# Patient Record
Sex: MALE | Race: WHITE | NOT HISPANIC OR LATINO | Employment: UNEMPLOYED | URBAN - METROPOLITAN AREA
[De-identification: names, ages, dates, MRNs, and addresses within clinical notes are randomized per-mention and may not be internally consistent; named-entity substitution may affect disease eponyms.]

---
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
## 2024-01-07 ENCOUNTER — APPOINTMENT (EMERGENCY)
Dept: CT IMAGING | Facility: HOSPITAL | Age: 19
End: 2024-01-07
Payer: COMMERCIAL

## 2024-01-07 ENCOUNTER — HOSPITAL ENCOUNTER (EMERGENCY)
Facility: HOSPITAL | Age: 19
End: 2024-01-07
Attending: EMERGENCY MEDICINE | Admitting: EMERGENCY MEDICINE
Payer: COMMERCIAL

## 2024-01-07 ENCOUNTER — HOSPITAL ENCOUNTER (OUTPATIENT)
Facility: HOSPITAL | Age: 19
Setting detail: OBSERVATION
Discharge: HOME | End: 2024-01-08
Attending: SURGERY | Admitting: SURGERY
Payer: COMMERCIAL

## 2024-01-07 VITALS
TEMPERATURE: 97.7 F | HEART RATE: 69 BPM | OXYGEN SATURATION: 97 % | SYSTOLIC BLOOD PRESSURE: 122 MMHG | RESPIRATION RATE: 16 BRPM | DIASTOLIC BLOOD PRESSURE: 70 MMHG

## 2024-01-07 DIAGNOSIS — G44.309 POST-TRAUMATIC HEADACHE: ICD-10-CM

## 2024-01-07 DIAGNOSIS — R20.2 RIGHT HAND PARESTHESIA: ICD-10-CM
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
## 2024-01-07 DIAGNOSIS — R20.2 PARESTHESIAS: Primary | ICD-10-CM

## 2024-01-07 DIAGNOSIS — M54.2 NECK PAIN: ICD-10-CM
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
## 2024-01-07 DIAGNOSIS — S06.0XAA CONCUSSION: Primary | ICD-10-CM

## 2024-01-07 LAB
ALBUMIN SERPL BCP-MCNC: 5.1 G/DL (ref 3.5–5)
ALP SERPL-CCNC: 64 U/L (ref 34–104)
ALT SERPL W P-5'-P-CCNC: 9 U/L (ref 7–52)
ANION GAP SERPL CALCULATED.3IONS-SCNC: 8 MMOL/L
AST SERPL W P-5'-P-CCNC: 14 U/L (ref 13–39)
BASOPHILS # BLD AUTO: 0.05 THOUSANDS/ÂΜL (ref 0–0.1)
BASOPHILS NFR BLD AUTO: 0 % (ref 0–1)
BILIRUB SERPL-MCNC: 0.58 MG/DL (ref 0.2–1)
BUN SERPL-MCNC: 18 MG/DL (ref 5–25)
CALCIUM SERPL-MCNC: 9.7 MG/DL (ref 8.4–10.2)
CHLORIDE SERPL-SCNC: 104 MMOL/L (ref 96–108)
CO2 SERPL-SCNC: 27 MMOL/L (ref 21–32)
CREAT SERPL-MCNC: 1.01 MG/DL (ref 0.6–1.3)
EOSINOPHIL # BLD AUTO: 0.03 THOUSAND/ÂΜL (ref 0–0.61)
EOSINOPHIL NFR BLD AUTO: 0 % (ref 0–6)
ERYTHROCYTE [DISTWIDTH] IN BLOOD BY AUTOMATED COUNT: 13 % (ref 11.6–15.1)
GFR SERPL CREATININE-BSD FRML MDRD: 107 ML/MIN/1.73SQ M
GLUCOSE SERPL-MCNC: 97 MG/DL (ref 65–140)
HCT VFR BLD AUTO: 40.8 % (ref 36.5–49.3)
HGB BLD-MCNC: 13.6 G/DL (ref 12–17)
IMM GRANULOCYTES # BLD AUTO: 0.05 THOUSAND/UL (ref 0–0.2)
IMM GRANULOCYTES NFR BLD AUTO: 0 % (ref 0–2)
LYMPHOCYTES # BLD AUTO: 2.7 THOUSANDS/ÂΜL (ref 0.6–4.47)
LYMPHOCYTES NFR BLD AUTO: 22 % (ref 14–44)
MCH RBC QN AUTO: 28.9 PG (ref 26.8–34.3)
MCHC RBC AUTO-ENTMCNC: 33.3 G/DL (ref 31.4–37.4)
MCV RBC AUTO: 87 FL (ref 82–98)
MONOCYTES # BLD AUTO: 1.04 THOUSAND/ÂΜL (ref 0.17–1.22)
MONOCYTES NFR BLD AUTO: 9 % (ref 4–12)
NEUTROPHILS # BLD AUTO: 8.41 THOUSANDS/ÂΜL (ref 1.85–7.62)
NEUTS SEG NFR BLD AUTO: 69 % (ref 43–75)
NRBC BLD AUTO-RTO: 0 /100 WBCS
PLATELET # BLD AUTO: 181 THOUSANDS/UL (ref 149–390)
PMV BLD AUTO: 9.8 FL (ref 8.9–12.7)
POTASSIUM SERPL-SCNC: 3.4 MMOL/L (ref 3.5–5.3)
PROT SERPL-MCNC: 7.4 G/DL (ref 6.4–8.4)
RBC # BLD AUTO: 4.71 MILLION/UL (ref 3.88–5.62)
SODIUM SERPL-SCNC: 139 MMOL/L (ref 135–147)
WBC # BLD AUTO: 12.28 THOUSAND/UL (ref 4.31–10.16)

## 2024-01-07 PROCEDURE — 99285 EMERGENCY DEPT VISIT HI MDM: CPT | Performed by: EMERGENCY MEDICINE

## 2024-01-07 PROCEDURE — 99284 EMERGENCY DEPT VISIT MOD MDM: CPT

## 2024-01-07 PROCEDURE — 85025 COMPLETE CBC W/AUTO DIFF WBC: CPT | Performed by: EMERGENCY MEDICINE

## 2024-01-07 PROCEDURE — 70450 CT HEAD/BRAIN W/O DYE: CPT

## 2024-01-07 PROCEDURE — 96374 THER/PROPH/DIAG INJ IV PUSH: CPT

## 2024-01-07 PROCEDURE — 72125 CT NECK SPINE W/O DYE: CPT

## 2024-01-07 PROCEDURE — 96361 HYDRATE IV INFUSION ADD-ON: CPT

## 2024-01-07 PROCEDURE — 96375 TX/PRO/DX INJ NEW DRUG ADDON: CPT

## 2024-01-07 PROCEDURE — 36415 COLL VENOUS BLD VENIPUNCTURE: CPT | Performed by: EMERGENCY MEDICINE

## 2024-01-07 PROCEDURE — 80053 COMPREHEN METABOLIC PANEL: CPT | Performed by: EMERGENCY MEDICINE

## 2024-01-07 RX ORDER — DIPHENHYDRAMINE HYDROCHLORIDE 50 MG/ML
25 INJECTION INTRAMUSCULAR; INTRAVENOUS ONCE
Status: COMPLETED | OUTPATIENT
Start: 2024-01-07 | End: 2024-01-07

## 2024-01-07 RX ORDER — METOCLOPRAMIDE HYDROCHLORIDE 5 MG/ML
10 INJECTION INTRAMUSCULAR; INTRAVENOUS ONCE
Status: COMPLETED | OUTPATIENT
Start: 2024-01-07 | End: 2024-01-07

## 2024-01-07 RX ORDER — SODIUM CHLORIDE 9 MG/ML
100 INJECTION, SOLUTION INTRAVENOUS CONTINUOUS
Status: DISCONTINUED | OUTPATIENT
Start: 2024-01-07 | End: 2024-01-07 | Stop reason: HOSPADM

## 2024-01-07 RX ADMIN — SODIUM CHLORIDE 100 ML/HR: 0.9 INJECTION, SOLUTION INTRAVENOUS at 22:22

## 2024-01-07 RX ADMIN — METOCLOPRAMIDE 10 MG: 5 INJECTION, SOLUTION INTRAMUSCULAR; INTRAVENOUS at 21:47

## 2024-01-07 RX ADMIN — DIPHENHYDRAMINE HYDROCHLORIDE 25 MG: 50 INJECTION INTRAMUSCULAR; INTRAVENOUS at 21:47

## 2024-01-08 ENCOUNTER — APPOINTMENT (OUTPATIENT)
Dept: RADIOLOGY | Facility: HOSPITAL | Age: 19
End: 2024-01-08
Payer: COMMERCIAL

## 2024-01-08 VITALS
DIASTOLIC BLOOD PRESSURE: 61 MMHG | SYSTOLIC BLOOD PRESSURE: 117 MMHG | RESPIRATION RATE: 18 BRPM | WEIGHT: 160 LBS | HEART RATE: 66 BPM | TEMPERATURE: 98.2 F | OXYGEN SATURATION: 97 %

## 2024-01-08 PROBLEM — R20.2 PARESTHESIAS: Status: ACTIVE | Noted: 2024-01-08

## 2024-01-08 PROBLEM — M54.2 NECK PAIN: Status: ACTIVE | Noted: 2024-01-08
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
## 2024-01-08 PROCEDURE — 72141 MRI NECK SPINE W/O DYE: CPT

## 2024-01-08 RX ORDER — CITALOPRAM HYDROBROMIDE 10 MG/1
10 TABLET ORAL
Status: DISCONTINUED | OUTPATIENT
Start: 2024-01-08 | End: 2024-01-08 | Stop reason: HOSPADM

## 2024-01-08 RX ORDER — ENOXAPARIN SODIUM 100 MG/ML
30 INJECTION SUBCUTANEOUS EVERY 12 HOURS
Status: DISCONTINUED | OUTPATIENT
Start: 2024-01-08 | End: 2024-01-08 | Stop reason: HOSPADM

## 2024-01-08 RX ORDER — ACETAMINOPHEN 325 MG/1
650 TABLET ORAL EVERY 6 HOURS PRN
Status: DISCONTINUED | OUTPATIENT
Start: 2024-01-08 | End: 2024-01-08 | Stop reason: HOSPADM
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
## 2024-01-08 NOTE — ED PROVIDER NOTES
"Emergency Department Trauma Note  Phil Valentine 18 y.o. male MRN: [id number]  Unit/Bed#: ED TR13/TR13B Encounter: [id number]      Trauma Alert: Trauma Acuity: Trauma Evaluation  Model of Arrival:   via    Trauma Team: Current Providers  Attending Provider: Dana Howard MD  Registered Nurse: Amrita Israel RN  Consultants:     None      History of Present Illness     Chief Complaint:   Chief Complaint   Patient presents with    Fall     Pt reports hitting the back of his head on a metal beam while snowboarding today at 1930, now having visual disturbance in R eye with dizziness.  Denies n/v, asa/thinners.  Pt reports \"vision went dark for 5-10 seconds and I felt like I couldn't see\".  Pt was wearing helmet.     HPI:  Phil Valentine is a 18 y.o. male who presents with post traumatic headache.  Mechanism:Details of Incident: hit back of head on metal bar while snowboarding, vision went black for 5-10 seconds, R eye visual disturbance and dizziness Injury Date: 01/07/24 Injury Time: 1930      18 year old male presents for evaluation of headache, unsteadiness and visual changes which have been gradually worsening since striking his head during a snowboarding accident around 7:30 pm this evening.  Patient states he was attempting a trick jump on a metal rail when he fell back 2-3 ft, striking the back of his helmet on the metal rail.  Patient states his vision went completely black for several seconds and he felt confused initially.  He now has blurring of the superior visual field of his right eye, photophobia and headache which is occipital radiating to the right parietal region.  Patient additionally reports neck pain and states that in the time that he was waiting in the ED, his 1st-3rd digits of his right hand have become numb.  Patient has no chronic medical conditions.  He does not wear glasses or contact lenses.  Patient has not taken anything for his symptoms prior to " arrival.      Fall    Review of Systems    Historical Information     Immunizations:   Immunization History   Administered Date(s) Administered    DTaP 2004, 2004, 2004, 2005, 03/10/2008    Hep A, ped/adol, 2 dose 2011, 2011    Hep B / HiB 2004, 2004    Hep B, Adolescent or Pediatric 2004    Hepatitis A 2011, 2011    Hib (PRP-T) 2005    INFLUENZA 2004, 2004, 2005, 2008, 2009, 2010, 10/01/2014, 2016, 2018, 2019    IPV 2004, 2004, 2004, 03/10/2008    Influenza, seasonal, injectable 2006    Influenza, seasonal, injectable, preservative free 2011, 10/11/2012, 10/14/2013    MMR 2005, 2009    Meningococcal Conjugate (MCV4O) 2020    Meningococcal MCV4P 2015, 2020    Pneumococcal Conjugate PCV 7 2004, 2004, 2004, 2005    Tdap 2015    Varicella 2005, 2009       Past Medical History:   Diagnosis Date    GERD (gastroesophageal reflux disease)     Mesenteric adenitis     Noted on CT 2018, improved with conservative management       Family History   Problem Relation Age of Onset    Colon polyps Neg Hx     Colon cancer Neg Hx      History reviewed. No pertinent surgical history.  Social History     Tobacco Use    Smoking status: Never    Smokeless tobacco: Never   Vaping Use    Vaping status: Never Used   Substance Use Topics    Alcohol use: Not Currently    Drug use: Never     E-Cigarette/Vaping    E-Cigarette Use Never User      E-Cigarette/Vaping Substances    Nicotine No     Flavoring No        Family History: non-contributory    Meds/Allergies   Prior to Admission Medications   Prescriptions Last Dose Informant Patient Reported? Taking?   Albuterol Sulfate, sensor, 108 (90 Base) MCG/ACT AEPB   Yes No   Si puffs as needed   citalopram (CeleXA) 10 mg tablet   Yes No   Sig: Take 10 mg by  mouth daily in the early morning      Facility-Administered Medications: None       Allergies   Allergen Reactions    Amoxicillin Hives       PHYSICAL EXAM    PE limited by: none    Objective   Vitals:   First set: Temperature: 97.7 °F (36.5 °C) (01/07/24 2101)  Pulse: 67 (01/07/24 2101)  Respirations: 18 (01/07/24 2101)  Blood Pressure: 119/71 (01/07/24 2101)  SpO2: 97 % (01/07/24 2101)    Primary Survey:   (A) Airway: patent  (B) Breathing: bilateral breath sounds  (C) Circulation: Pulses:   normal  (D) Disabliity:  GCS Total:  15  (E) Expose:  Completed    Breath sounds equal. No crepitus or chest wall tenderness with compression over the chest. No pain or instability with compression over the pelvis. No bedside imaging required. Patient is stable to proceed to CT scan.    Secondary Survey: (Click on Physical Exam tab above)  Physical Exam  Vitals and nursing note reviewed.   HENT:      Head: Normocephalic and atraumatic.   Eyes:      Extraocular Movements: Extraocular movements intact.      Conjunctiva/sclera: Conjunctivae normal.      Pupils: Pupils are equal, round, and reactive to light.   Neck:      Comments: Tenderness C1-C3.  No step offs or deformities.  No T or L spine tenderness.  Cardiovascular:      Rate and Rhythm: Normal rate and regular rhythm.      Pulses: Normal pulses.           Radial pulses are 2+ on the right side and 2+ on the left side.        Dorsalis pedis pulses are 2+ on the right side and 2+ on the left side.        Posterior tibial pulses are 2+ on the right side and 2+ on the left side.      Heart sounds: Normal heart sounds.   Pulmonary:      Effort: Pulmonary effort is normal. No respiratory distress.      Breath sounds: Normal breath sounds.   Chest:      Chest wall: No tenderness or crepitus.   Abdominal:      General: There is no distension.      Palpations: Abdomen is soft.      Tenderness: There is no abdominal tenderness.   Musculoskeletal:         General: No deformity.       Right lower leg: No edema.      Left lower leg: No edema.   Skin:     General: Skin is warm and dry.   Neurological:      Mental Status: He is alert.      Motor: Motor function is intact.      Comments: Blurring of superior visual fields of right eye only (counts 1 finger instead of 2).  No facial asymmetry, dysarthria or aphasia.  Decreased sensation to right 1st-3rd digits. Normal sensation in all other distributions of the extremity.  Normal sensation in left upper extremity and bilateral lower extremities.  5/5 strength throughout.           Cervical spine cleared by clinical criteria? No (imaging required)      Invasive Devices       Peripheral Intravenous Line  Duration             Peripheral IV 01/07/24 Right Antecubital <1 day                    Lab Results:   Results Reviewed       Procedure Component Value Units Date/Time    Comprehensive metabolic panel [[id number]]  (Abnormal) Collected: 01/07/24 2128    Lab Status: Final result Specimen: Blood from Arm, Right Updated: 01/07/24 2151     Sodium 139 mmol/L      Potassium 3.4 mmol/L      Chloride 104 mmol/L      CO2 27 mmol/L      ANION GAP 8 mmol/L      BUN 18 mg/dL      Creatinine 1.01 mg/dL      Glucose 97 mg/dL      Calcium 9.7 mg/dL      AST 14 U/L      ALT 9 U/L      Alkaline Phosphatase 64 U/L      Total Protein 7.4 g/dL      Albumin 5.1 g/dL      Total Bilirubin 0.58 mg/dL      eGFR 107 ml/min/1.73sq m     Narrative:      National Kidney Disease Foundation guidelines for Chronic Kidney Disease (CKD):     Stage 1 with normal or high GFR (GFR > 90 mL/min/1.73 square meters)    Stage 2 Mild CKD (GFR = 60-89 mL/min/1.73 square meters)    Stage 3A Moderate CKD (GFR = 45-59 mL/min/1.73 square meters)    Stage 3B Moderate CKD (GFR = 30-44 mL/min/1.73 square meters)    Stage 4 Severe CKD (GFR = 15-29 mL/min/1.73 square meters)    Stage 5 End Stage CKD (GFR <15 mL/min/1.73 square meters)  Note: GFR calculation is accurate only with a steady state creatinine     CBC and differential [[id number]]  (Abnormal) Collected: 01/07/24 2128    Lab Status: Final result Specimen: Blood from Arm, Right Updated: 01/07/24 2133     WBC 12.28 Thousand/uL      RBC 4.71 Million/uL      Hemoglobin 13.6 g/dL      Hematocrit 40.8 %      MCV 87 fL      MCH 28.9 pg      MCHC 33.3 g/dL      RDW 13.0 %      MPV 9.8 fL      Platelets 181 Thousands/uL      nRBC 0 /100 WBCs      Neutrophils Relative 69 %      Immat GRANS % 0 %      Lymphocytes Relative 22 %      Monocytes Relative 9 %      Eosinophils Relative 0 %      Basophils Relative 0 %      Neutrophils Absolute 8.41 Thousands/µL      Immature Grans Absolute 0.05 Thousand/uL      Lymphocytes Absolute 2.70 Thousands/µL      Monocytes Absolute 1.04 Thousand/µL      Eosinophils Absolute 0.03 Thousand/µL      Basophils Absolute 0.05 Thousands/µL                    Imaging Studies:   Direct to CT: Yes  TRAUMA - CT head wo contrast   Final Result by Alvino Watson MD (01/07 2154)      No intracranial hemorrhage or calvarial fracture.                  Workstation performed: [id number]         TRAUMA - CT spine cervical wo contrast   Final Result by Alvino Watson MD (01/07 2203)      No cervical spine fracture or traumatic malalignment.                  Workstation performed: [id number]               Procedures  Procedures         ED Course  ED Course as of 01/07/24 2303   Sun Jan 07, 2024 2211 CT C-spine unremarkable; however, patient continues to have decreased sensation right 1st-3rd digits.  Normal motor exam.  Unable to clear C-spine.            Medical Decision Making  18 year old right-handed male presents for evaluation after snowboarding accident.  Post traumatic headache with blurring of right superior visual fields.  C-spine tenderness at the levels of C1-C3 with numbness of the 1st-3rd digits of the right hand (C6-C7 distribution); however, normal sensation over distributions at forearm and upper arm.  C-collar placed.  CTH and C-spine  negative; however, given persistent sensory deficit in the right hand, unable to clear C-spine.  Patient transferred to [facility] for further evaluation and management.    Amount and/or Complexity of Data Reviewed  Labs: ordered.  Radiology: ordered.    Risk  Prescription drug management.                Disposition  [facility] Transfer: No  Final diagnoses:   Concussion   Post-traumatic headache   Neck pain   Right hand paresthesia   Snowboard accident, initial encounter     Time reflects when diagnosis was documented in both MDM as applicable and the Disposition within this note       Time User Action Codes Description Comment    1/7/2024 10:03 PM Dana Howard Add [S06.0XAA] Concussion     1/7/2024 10:10 PM AnitaSonny reaganica J Add [G44.309] Post-traumatic headache     1/7/2024 10:10 PM AnitaSonny reaganica J Add [M54.2] Neck pain     1/7/2024 10:11 PM AnitaSonny reaganica J Add [R20.2] Right hand paresthesia     1/7/2024 10:11 PM Sonny Howardica J Add [V00.318A] Snowboard accident, initial encounter           ED Disposition       ED Disposition   Transfer to Another Facility-In Network    Condition   --    Date/Time   Sun Jan 7, 2024 10:18 PM    Comment   Phil Valentine should be transferred out to [facility].               MD Documentation      Flowsheet Row Most Recent Value   Patient Condition The patient has been stabilized such that within reasonable medical probability, no material deterioration of the patient condition or the condition of the unborn child(seb) is likely to result from the transfer   Benefits of Transfer Specialized equipment and/or services available at the receiving facility (Include comment)________________________  [neurosurgery, trauma surgery]   Risks of Transfer Potential for delay in receiving treatment, Increased discomfort during transfer, Possible worsening of condition or death during transfer, Potential deterioration of medical condition, Loss of IV   Accepting Physician Dr. Liz   Accepting  Facility Name, [facility], [place], [place]   Sending MD Dr. Howard   Provider Certification Risk of worsening condition, General risk, such as traffic hazards, adverse weather conditions, rough terrain or turbulence, possible failure of equipment (including vehicle or aircraft), or consequences of actions of persons outside the control of the transport personnel, Unanticipated needs of medical equipment and personnel during transport, The possibility of a transport vehicle being unavailable          RN Documentation      Flowsheet Row Most Recent Value   Accepting Facility Name, [facility], [place], [place]          Follow-up Information    None       Patient's Medications   Discharge Prescriptions    No medications on file     No discharge procedures on file.    PDMP Review       None            ED Provider  Electronically Signed by           Dana Howard MD  01/07/24 6445

## 2024-01-08 NOTE — ASSESSMENT & PLAN NOTE
-In right hand following snowboarding accident, because of this unable to clear cervical spine despite negative CT imaging  -Will obtain MRI  -Neurosurgery consulted, appreciate recommendations

## 2024-01-08 NOTE — DISCHARGE INSTR - AVS FIRST PAGE
Your evaluated today with neck pain and paresthesias.  You are not found to have any emergent pathology on your imaging and we feel safe discharging you home.  Please follow-up with your primary care physician within 1 week for reevaluation.    It is possible that you sustained a concussion.  I wrote a referral to the concussion clinic.  They will call you.  Please follow-up with them.  Please avoid any tinnitus in which she could reinjure your head.    Please return to the emergency department if you experience severe, significant headaches, recurrent vomiting, changes in mental status, weakness, changes in sensation, or other concerning symptoms.

## 2024-01-08 NOTE — QUICK NOTE
Cervical Collar Clearance:    The patient had a CT scan of the cervical spine demonstrating no acute injury. On exam, the patient had no midline point tenderness or paresthesias/numbness/weakness in the extremities. The patient had full range of motion (was then able to flex, extend, and rotate head laterally) without pain. There were no distracting injuries and the patient was not intoxicated.      The patient's cervical spine was cleared radiologically and clinically. Cervical collar removed at this time.     Jorge Luis Carlson DO  1/8/2024 3:52 AM

## 2024-01-08 NOTE — ASSESSMENT & PLAN NOTE
-CT imaging negative  -Will obtain MRI as above  -Maintain cervical collar, cervical spine precautions  -Multimodal pain control

## 2024-01-08 NOTE — EMTALA/ACUTE CARE TRANSFER
[facility] EMERGENCY DEPARTMENT  [address] [facility] [facility] [address]  Dept: [phone]      EMTALA TRANSFER CONSENT    NAME Phil Valentine                                         2005                              MRN [id number]    I have been informed of my rights regarding examination, treatment, and transfer   by Dr. Dana Howard*    Benefits: Specialized equipment and/or services available at the receiving facility (Include comment)________________________ (neurosurgery, trauma surgery)    Risks: Potential for delay in receiving treatment, Increased discomfort during transfer, Possible worsening of condition or death during transfer, Potential deterioration of medical condition, Loss of IV      Consent for Transfer:  I acknowledge that my medical condition has been evaluated and explained to me by the emergency department physician or other qualified medical person and/or my attending physician, who has recommended that I be transferred to the service of  Accepting Physician: Dr. Liz at Accepting Facility Name, City & State : [place], [place]. The above potential benefits of such transfer, the potential risks associated with such transfer, and the probable risks of not being transferred have been explained to me, and I fully understand them.  The doctor has explained that, in my case, the benefits of transfer outweigh the risks.  I agree to be transferred.    I authorize the performance of emergency medical procedures and treatments upon me in both transit and upon arrival at the receiving facility.  Additionally, I authorize the release of any and all medical records to the receiving facility and request they be transported with me, if possible.  I understand that the safest mode of transportation during a medical emergency is an ambulance and that the Hospital advocates the use of this mode of transport. Risks of traveling to  the receiving facility by car, including absence of medical control, life sustaining equipment, such as oxygen, and medical personnel has been explained to me and I fully understand them.    (BRITTNEY CORRECT BOX BELOW)  [  ]  I consent to the stated transfer and to be transported by ambulance/helicopter.  [  ]  I consent to the stated transfer, but refuse transportation by ambulance and accept full responsibility for my transportation by car.  I understand the risks of non-ambulance transfers and I exonerate the Hospital and its staff from any deterioration in my condition that results from this refusal.    X___________________________________________    DATE  24  TIME________  Signature of patient or legally responsible individual signing on patient behalf           RELATIONSHIP TO PATIENT_________________________          Provider Certification    NAME Phil Valentine                                         2005                              MRN [id number]    A medical screening exam was performed on the above named patient.  Based on the examination:    Condition Necessitating Transfer The primary encounter diagnosis was Concussion. Diagnoses of Post-traumatic headache, Neck pain, Right hand paresthesia, and Snowboard accident, initial encounter were also pertinent to this visit.    Patient Condition: The patient has been stabilized such that within reasonable medical probability, no material deterioration of the patient condition or the condition of the unborn child(seb) is likely to result from the transfer    Reason for Transfer:      Transfer Requirements: Facility [place], [place]   Space available and qualified personnel available for treatment as acknowledged by    [facility] to accept transfer and to provide appropriate medical treatment as acknowledged by       Dr. Liz  Appropriate medical records of the examination and treatment of the patient are provided at the time  of transfer   STAFF INITIAL WHEN COMPLETED _______  Transfer will be performed by qualified personnel from    and appropriate transfer equipment as required, including the use of necessary and appropriate life support measures.    Provider Certification: I have examined the patient and explained the following risks and benefits of being transferred/refusing transfer to the patient/family:  Risk of worsening condition, General risk, such as traffic hazards, adverse weather conditions, rough terrain or turbulence, possible failure of equipment (including vehicle or aircraft), or consequences of actions of persons outside the control of the transport personnel, Unanticipated needs of medical equipment and personnel during transport, The possibility of a transport vehicle being unavailable      Based on these reasonable risks and benefits to the patient and/or the unborn child(seb), and based upon the information available at the time of the patient’s examination, I certify that the medical benefits reasonably to be expected from the provision of appropriate medical treatments at another medical facility outweigh the increasing risks, if any, to the individual’s medical condition, and in the case of labor to the unborn child, from effecting the transfer.    X____________________________________________ DATE 01/07/24        TIME_______      ORIGINAL - SEND TO MEDICAL RECORDS   COPY - SEND WITH PATIENT DURING TRANSFER

## 2024-01-08 NOTE — H&P
"[facility]  H&P  Name: Phil Valentine 18 y.o. male I MRN: [id number]  Unit/Bed#: ED 19 I Date of Admission: 1/7/2024   Date of Service: 1/8/2024 I Hospital Day: 0      Assessment/Plan   Snowboarding accident, initial encounter  Assessment & Plan  -Multimodal pain control    Neck pain  Assessment & Plan  -CT imaging negative  -Will obtain MRI as above  -Maintain cervical collar, cervical spine precautions  -Multimodal pain control    Paresthesias  Assessment & Plan  -In right hand following snowboarding accident, because of this unable to clear cervical spine despite negative CT imaging  -Will obtain MRI  -Neurosurgery consulted, appreciate recommendations           Trauma Alert: Evaluation; trauma team notified at 12:15 via text   Model of Arrival: Transfer UB     Trauma Team: Attending To and Residents Jorge Luis  Consultants:     Neurosurgery: routine consult; Epic consult order placed;     History of Present Illness     Chief Complaint: neck pain  Mechanism:Fall     HPI:    Phil Valentine is a 18 y.o. male who presents as a trauma transfer following a snowboarding accident.  He reports that he was snowboarding prior to arrival and had a mechanical fall striking his head against the metal pole.  He had a brief 4 to 5 seconds where he \"blacked out\".  He was wearing a helmet.  He denies any AP/AC use.  He experienced neck pain following the accident and was evaluated at [facility] emergency department.  He had a CT head and CT cervical spine performed which were negative for acute abnormalities.  Patient C-spine was unable to be cleared secondary to paresthesias in his right hand and so he was sent to [facility] for further evaluation.    Review of Systems   Eyes:  Negative for visual disturbance.   Respiratory:  Negative for shortness of breath.    Cardiovascular:  Negative for chest pain.   Musculoskeletal:  Positive for neck pain.   Neurological:  Positive for " numbness. Negative for weakness.     12-point, complete review of systems was reviewed and negative except as stated above.     Historical Information     Past Medical History:   Diagnosis Date    GERD (gastroesophageal reflux disease)     Mesenteric adenitis     Noted on CT December 2018, improved with conservative management     History reviewed. No pertinent surgical history.     Social History     Tobacco Use    Smoking status: Never    Smokeless tobacco: Never   Vaping Use    Vaping status: Never Used   Substance Use Topics    Alcohol use: Not Currently    Drug use: Never     Immunization History   Administered Date(s) Administered    DTaP 2004, 2004, 2004, 06/06/2005, 03/10/2008    Hep A, ped/adol, 2 dose 01/19/2011, 07/20/2011    Hep B / HiB 2004, 2004    Hep B, Adolescent or Pediatric 2004    Hepatitis A 01/19/2011, 07/20/2011    Hib (PRP-T) 06/06/2005    INFLUENZA 2004, 2004, 11/16/2005, 11/20/2008, 12/30/2009, 11/18/2010, 10/01/2014, 09/21/2016, 09/04/2018, 09/09/2019    IPV 2004, 2004, 2004, 03/10/2008    Influenza, seasonal, injectable 11/17/2006    Influenza, seasonal, injectable, preservative free 11/26/2011, 10/11/2012, 10/14/2013    MMR 03/03/2005, 03/09/2009    Meningococcal Conjugate (MCV4O) 03/18/2020    Meningococcal MCV4P 07/06/2015, 03/18/2020    Pneumococcal Conjugate PCV 7 2004, 2004, 2004, 06/06/2005    Tdap 07/06/2015    Varicella 03/03/2005, 03/09/2009     Last Tetanus: 2015  Family History: Non-contributory     Meds/Allergies   all current active meds have been reviewed     Allergies   Allergen Reactions    Amoxicillin Hives       Objective   Initial Vitals:   Pulse: 72 (01/08/24 0000)  Respirations: 18 (01/08/24 0000)  Blood Pressure: 123/58 (01/08/24 0000)    Primary Survey:   Airway:        Status: patent;        Pre-hospital Interventions: none        Hospital Interventions: none  Breathing:         Pre-hospital Interventions: none       Effort: normal       Right breath sounds: normal       Left breath sounds: normal  Circulation:        Rhythm: regular       Rate: regular   Right Pulses Left Pulses    R radial: 2+    R pedal: 2+     L radial: 2+    L pedal: 2+       Disability:        GCS: Eye: 4; Verbal: 5 Motor: 6 Total: 15       Right Pupil: 3 mm;  round;  reactive         Left Pupil:  3 mm;  round;  reactive      R Motor Strength L Motor Strength    R : 5/5  R dorsiflex: 5/5  R plantarflex: 5/5 L : 5/5  L dorsiflex: 5/5  L plantarflex: 5/5          Exposure:       Completed: Yes      Secondary Survey:  Physical Exam  Constitutional:       Appearance: Normal appearance.   HENT:      Head: Normocephalic and atraumatic.      Right Ear: External ear normal.      Left Ear: External ear normal.      Nose: Nose normal.      Mouth/Throat:      Mouth: Mucous membranes are moist.   Eyes:      Extraocular Movements: Extraocular movements intact.      Pupils: Pupils are equal, round, and reactive to light.   Neck:      Comments: Cervical collar in place  Cardiovascular:      Rate and Rhythm: Normal rate and regular rhythm.      Pulses: Normal pulses.   Pulmonary:      Effort: Pulmonary effort is normal.      Breath sounds: Normal breath sounds.   Abdominal:      General: Abdomen is flat.      Palpations: Abdomen is soft.      Tenderness: There is no abdominal tenderness.   Musculoskeletal:      Cervical back: Tenderness (Upper cervical spine) present.      Right lower leg: No edema.      Left lower leg: No edema.   Skin:     General: Skin is warm and dry.   Neurological:      General: No focal deficit present.      Mental Status: He is alert.      Comments: Patient reports resolved paresthesias in hand         Invasive Devices       Peripheral Intravenous Line  Duration             Peripheral IV 01/07/24 Right Antecubital <1 day                  Lab Results: I have personally reviewed all pertinent  laboratory/test results from 01/08/24, including the preceding 24 hours.  Recent Labs     01/07/24  2128   WBC 12.28*   HGB 13.6   HCT 40.8      SODIUM 139   K 3.4*      CO2 27   BUN 18   CREATININE 1.01   GLUC 97   AST 14   ALT 9   ALB 5.1*   TBILI 0.58   ALKPHOS 64       Imaging Results: I have personally reviewed pertinent images saved in PACS. CT scan findings (and other pertinent positive findings on images) were discussed with radiology. My interpretation of the images/reports are as follows:  Chest Xray(s): N/A   FAST exam(s): N/A   CT Scan(s): negative for acute findings   Additional Xray(s): N/A     Other Studies: none    Code Status: Level 1 - Full Code  Advance Directive and Living Will:      Power of :    POLST:    I have spent 32 minutes with Patient and family today in which greater than 50% of this time was spent in counseling/coordination of care regarding Diagnostic results, Impressions, Documenting in the medical record, Reviewing / ordering tests, medicine, procedures  , and Obtaining or reviewing history  .

## 2024-01-17 ENCOUNTER — OFFICE VISIT (OUTPATIENT)
Dept: OBGYN CLINIC | Facility: CLINIC | Age: 19
End: 2024-01-17
Payer: COMMERCIAL

## 2024-01-17 ENCOUNTER — TELEPHONE (OUTPATIENT)
Age: 19
End: 2024-01-17

## 2024-01-17 VITALS
WEIGHT: 155 LBS | HEIGHT: 71 IN | BODY MASS INDEX: 21.7 KG/M2 | SYSTOLIC BLOOD PRESSURE: 112 MMHG | HEART RATE: 68 BPM | DIASTOLIC BLOOD PRESSURE: 80 MMHG

## 2024-01-17 DIAGNOSIS — H53.9 VISION CHANGES: ICD-10-CM

## 2024-01-17 DIAGNOSIS — S13.4XXA WHIPLASH INJURY TO NECK, INITIAL ENCOUNTER: ICD-10-CM
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
## 2024-01-17 DIAGNOSIS — S06.0X0A CONCUSSION WITHOUT LOSS OF CONSCIOUSNESS, INITIAL ENCOUNTER: Primary | ICD-10-CM

## 2024-01-17 PROCEDURE — 99204 OFFICE O/P NEW MOD 45 MIN: CPT | Performed by: FAMILY MEDICINE

## 2024-01-17 NOTE — TELEPHONE ENCOUNTER
Reviewed ov note and see referral for [facility] and for neurology-no specific name.  Please see msg from pt's mom, Olya and advise. Thank you.

## 2024-01-17 NOTE — TELEPHONE ENCOUNTER
Caller: Olya (mom)     Doctor: Lashae     Reason for call: patient received a referral  to go to PT but they where not sure if he was supposed to schedule with [facility] or [facility] and she also wanted to know if there was a certain neurologist     Call back#: [phone]

## 2024-01-17 NOTE — PROGRESS NOTES
1. Concussion without loss of consciousness, initial encounter   Physical Therapy    Ambulatory Referral to Ophthalmology      2. Snowboarding accident, initial encounter  Ambulatory Referral to Comprehensive Concussion Program      3. Whiplash injury to neck, initial encounter   Physical Therapy      4. Vision changes  Ambulatory Referral to Neurology    Ambulatory Referral to Ophthalmology        Orders Placed This Encounter   Procedures    Ambulatory Referral to Neurology    Ambulatory Referral to Ophthalmology     Physical Therapy        IMAGING STUDIES: (I personally reviewed images in PACS and report):      PAST REPORTS:  CT head 1/7/2024:  Report: No intracranial hemorrhage or calvarial fracture    CT cervical spine 1/7/2024:  Report: No cervical spine fracture or traumatic malalignment    MRI cervical spine 1/8/2024:  Report:Unremarkable unenhanced MRI of the cervical spine.  Specifically no evidence of spinal cord contusion/injury as clinically questioned.      ASSESSMENT/PLAN:  Complex Head injury with Mild Traumatic Brain Injury consistent with Concussion  Written letter provided for school and/or work accommodations due to functional deficit  Neck whiplash injury  DOI: 1/7/24 Snowboarding  FUI: 10 days  Occupation: Sales    Repeat X-ray next visit: None    Return in about 2 weeks (around 1/31/2024).    Patient instructions below verbally summarized in person during encounter:  Patient Instructions   Due to vision changes in right eye, I recommended urgent appointment with ophthalmology to protect patient's vision. Patient expressed understanding and agreed to plan.     I recommended against work  Begin physical therapy for neck  Take a Multivitamin daily if not already  Sleep hygiene- at least 8 hours of sleep  No excessive use of digital screens but may use phone and play video games with breaks to rest the eyes at least once per hour. Stop all digital screen use if symptoms begin to worsen  Do  not take NSAIDs (ibuprofen, motrin, aspirin, advil, aleve, naproxen) until 72 hours after injury event, but may take tylenol (acetaminophen) as needed for headaches    red flags symptoms occurring at any time even after 24 hours include: severe or worsening headaches, somnolence/sleepiness/lethargy, confusion, restlessness/unsteadiness, seizures, vision changes, vomiting, fever, stiff neck, loss of bowel or bladder control, and weakness or numbness of any part of body. If red flag symptoms occur then immediately go to ER. If patient suffers another blow to head or body during sports or non-sports play then there is a risk of severe and possibly permanent brain injury from second hit syndrome brain edema. During concussion recovery, patient is to not participate in pick-up games, sports, weight-training, exercise, or gym until cleared by physician. If any return of symptoms requiring more academic rest then sports play must also be suspended due to delayed healing of concussion.         __________________________________________________________________________    HISTORY OF PRESENT ILLNESS:    Patient ID:  Phil Valentine is a 18 y.o. male    School:    Related to:  Snowboarding    School Status: Not back to school    Injury Description:  Date / Time: 1/7/2024  :  Patient  Injury Description:   Fell back to 3 feet striking the back of his helmet onto metal rail while tending to perform snowboarding trick.  Patient seen emergency department where he complained of blurred vision photophobia and headache.  Associated symptoms do include neck pain with numbness into the right hand.    Laboratory investigation revealed slight hypokalemia 3.4 as well as slight elevation leukocytosis WBC 12.28.    He was placed in observation after injury event and discharged home.     Amnesia:   Retrograde:  no   Anterograde:  no   LOC: YES    Early Signs:  Blurred vision and Headache  Seizures:  No  CT Scan:  Yes   History of  Concussion:  Yes.   How many?  1 in 2022     Headache History:  no chronic headaches    Currently constant moderate worse with light and noise    Right eye depth perception and focusing is blurry.     Family History of Headache:  No  Developmental History:   no  History of Sleep Disorder:  No  Psychiatric History:   no  Do symptoms worsen with Physical Activity?  N/A  Do symptoms worsen with Cognitive Activity?  N/A  Unable to tolerate computer screens      Overall Rating:  What percent is this person back to normal?  Patient 60 %    Posterior neck pain improved mildly. No radiation of pain down the arms. Numbness right hand and arm now resolved.       Review of Systems   Constitutional:  Positive for fatigue. Negative for chills and fever.   HENT:  Negative for ear pain and hearing loss.    Eyes:  Positive for photophobia.   Gastrointestinal:  Positive for nausea. Negative for vomiting.   Musculoskeletal:  Positive for neck pain.   Neurological:  Positive for dizziness and headaches.   Psychiatric/Behavioral:  Positive for decreased concentration and sleep disturbance. Negative for behavioral problems, confusion and suicidal ideas. The patient is not nervous/anxious.          Following history reviewed and update:    Past Medical History:   Diagnosis Date    GERD (gastroesophageal reflux disease)     Mesenteric adenitis     Noted on CT December 2018, improved with conservative management     History reviewed. No pertinent surgical history.  Social History   Social History     Substance and Sexual Activity   Alcohol Use Not Currently     Social History     Substance and Sexual Activity   Drug Use Never     Social History     Tobacco Use   Smoking Status Never   Smokeless Tobacco Never     Family History   Problem Relation Age of Onset    Colon polyps Neg Hx     Colon cancer Neg Hx      Allergies   Allergen Reactions    Amoxicillin Hives          Physical Exam  /80 (BP Location: Left arm, Patient Position:  "Sitting, Cuff Size: Standard)   Pulse 68   Ht 5' 11\" (1.803 m)   Wt 70.3 kg (155 lb)   BMI 21.62 kg/m²     Constitutional:  see vital signs  Gen: well-developed, normocephalic/atraumatic, well-groomed  Eyes: No inflammation or discharge of conjunctiva or lids; sclera clear   Pharynx: no inflammation, lesion, or mass of lips  Neck: supple, no masses, non-distended  MSK: no inflammation, lesion, mass, or clubbing of nails and digits except for other than mentioned below  SKIN: no visible rashes or skin lesions  Pulmonary/Chest: Effort normal. No respiratory distress.   NEURO: cranial nerves grossly intact  PSYCH:  Alert and oriented to person, place, and time; recent and remote memory intact; mood normal, no depression, anxiety, or agitation, judgment and insight good and intact     Ortho Exam  Cervical  ROM: intact  Midline spinous process tenderness: +occiput   Muscular Tenderness: None  Sensation UE Bilateral:  C5: normal  C6: normal  C7: normal  C8: normal  T1: normal  Strength UE: 5/5 elbow, wrist, fingers bilateral       Williamson Sign: none  Raccoon Eyes: none  Ear Drainage: none  Nose Drainage: none  PERRL  EOMI:  Normal without pain  Smooth Pursuits: normal  Two finger Point Saccades (two finger points eye movement): abnormal horizontal nystagmus  One finger Focus with Head Rotation:  normal  Near-Point Convergence (<10cm): abnormal.    Unilateral Monocular Accomodation (<12cm): normal left; abnormal right  Finger to nose: Normal  No Dysdiadokinesia  Single Leg Stance Eyes Open: normal  Single Leg Stance Eyes Closed: abnormal  Tandem Gait Eyes Open: normal  Tandem Gait Eyes Closed: abnormal    __________________________________________________________________________  Procedures              "

## 2024-01-17 NOTE — LETTER
January 17, 2024     Patient: Phil Valentine  YOB: 2005  Date of Visit: 1/17/2024      To Whom it May Concern:    Phil Valentine is under my professional care. Phil was seen in my office on 1/17/2024. I recommend against work at this time. Patient to be reevaluated on or about 2/1/24.     If you have any questions or concerns, please don't hesitate to call.         Sincerely,          Walter Bhardwaj III, DO        CC: No Recipients

## 2024-01-17 NOTE — PATIENT INSTRUCTIONS
Due to vision changes in right eye, I recommended urgent appointment with ophthalmology to protect patient's vision. Patient expressed understanding and agreed to plan.     I recommended against work  Begin physical therapy for neck  Take a Multivitamin daily if not already  Sleep hygiene- at least 8 hours of sleep  No excessive use of digital screens but may use phone and play video games with breaks to rest the eyes at least once per hour. Stop all digital screen use if symptoms begin to worsen  Do not take NSAIDs (ibuprofen, motrin, aspirin, advil, aleve, naproxen) until 72 hours after injury event, but may take tylenol (acetaminophen) as needed for headaches    red flags symptoms occurring at any time even after 24 hours include: severe or worsening headaches, somnolence/sleepiness/lethargy, confusion, restlessness/unsteadiness, seizures, vision changes, vomiting, fever, stiff neck, loss of bowel or bladder control, and weakness or numbness of any part of body. If red flag symptoms occur then immediately go to ER. If patient suffers another blow to head or body during sports or non-sports play then there is a risk of severe and possibly permanent brain injury from second hit syndrome brain edema. During concussion recovery, patient is to not participate in pick-up games, sports, weight-training, exercise, or gym until cleared by physician. If any return of symptoms requiring more academic rest then sports play must also be suspended due to delayed healing of concussion.

## 2024-01-18 ENCOUNTER — TELEPHONE (OUTPATIENT)
Dept: NEUROLOGY | Facility: CLINIC | Age: 19
End: 2024-01-18

## 2024-01-18 NOTE — TELEPHONE ENCOUNTER
Mom called to check for an appt in the CV location. Advised 1st open slot was in Feb. Mom decided to keep the current as is on 01/25/24 at 11am w/ [facility] in [place].

## 2024-01-25 ENCOUNTER — CONSULT (OUTPATIENT)
Dept: NEUROLOGY | Facility: CLINIC | Age: 19
End: 2024-01-25
Payer: COMMERCIAL

## 2024-01-25 VITALS
WEIGHT: 157.2 LBS | SYSTOLIC BLOOD PRESSURE: 122 MMHG | HEIGHT: 71 IN | DIASTOLIC BLOOD PRESSURE: 80 MMHG | TEMPERATURE: 98.4 F | BODY MASS INDEX: 22.01 KG/M2

## 2024-01-25 DIAGNOSIS — Z86.59 HISTORY OF DEPRESSION: ICD-10-CM

## 2024-01-25 DIAGNOSIS — R41.840 CONCENTRATION DEFICIT: ICD-10-CM

## 2024-01-25 DIAGNOSIS — S06.0X9A CONCUSSION WITH LOSS OF CONSCIOUSNESS: Primary | ICD-10-CM

## 2024-01-25 DIAGNOSIS — H53.9 VISION CHANGES: ICD-10-CM

## 2024-01-25 DIAGNOSIS — G44.309 POST-TRAUMATIC HEADACHE: ICD-10-CM

## 2024-01-25 DIAGNOSIS — M54.2 CERVICALGIA: ICD-10-CM

## 2024-01-25 PROCEDURE — 99205 OFFICE O/P NEW HI 60 MIN: CPT | Performed by: STUDENT IN AN ORGANIZED HEALTH CARE EDUCATION/TRAINING PROGRAM

## 2024-01-25 RX ORDER — CYPROHEPTADINE HYDROCHLORIDE 4 MG/1
4 TABLET ORAL
Qty: 10 TABLET | Refills: 0 | Status: SHIPPED | OUTPATIENT
Start: 2024-01-25 | End: 2024-02-04

## 2024-01-25 RX ORDER — PREDNISONE 20 MG/1
TABLET ORAL DAILY
Qty: 12 TABLET | Refills: 0 | Status: CANCELLED | OUTPATIENT
Start: 2024-01-25 | End: 2024-01-31

## 2024-01-25 RX ORDER — LANOLIN ALCOHOL/MO/W.PET/CERES
400 CREAM (GRAM) TOPICAL DAILY
Qty: 90 TABLET | Refills: 3 | Status: SHIPPED | OUTPATIENT
Start: 2024-01-25

## 2024-01-25 NOTE — PATIENT INSTRUCTIONS
Activity Plan:  General counseling as discussed regarding appropriate level of tolerable physical activity.      Gradual return to physical activity. It is ok to push through light symptoms, we just don't want to significantly exacerbate symptoms.     Additional Testing or Referrals:   -Labs: B12, Folate, TSH, Vitamin D    Headache/migraine treatment:   Abortive medications (for immediate treatment of a headache): Ok to take ibuprofen or acetaminophen for headaches, but try to limit the amount and frequency that you are taking to avoid medication overuse/rebound headache. Ideally no more than 2-3 days per week.    Bridge  Cyproheptadine 4mg daily at bedtime for 10 days    Over the counter preventive supplements for headaches/migraines - try for 2-3 months at least   (to take every day to help prevent headaches - not to take at the time of headache):  - Magnesium 400mg daily  - Can occasionally cause stomach upset - if so try at night, with food or stop, rarely can cause diarrhea if so stop (oxide or glycinate)  - Riboflavin (Vitamin B2) 400mg daily - FYI B2 may make your urine bright/neon yellow - find online     Lifestyle Recommendations:  - Maintain good sleep hygiene.  Going to bed and waking up at consistent times, avoiding excessive daytime naps, avoiding caffeinated beverages in the evening, avoid excessive stimulation in the evening and generally using bed primarily for sleeping.  One hour before bedtime would recommend turning lights down lower, decreasing your activity (may read quietly, listen to music at a low volume). When you get into bed, should eliminate all technology (no texting, emailing, playing with your phone, iPad or tablet in bed).  - Maintain good hydration. Drink  2L of fluid a day (4 typical small water bottles)  - Maintain good nutrition. In particular don't skip meals and eat balanced meals regularly.    Education and Follow-up  - Please contact us if any questions or concerns arise. Of  course, try to protect yourself from head injuries, and if any new concerning symptoms or significant blow to the head or body go to the emergency department.  - Follow up in 3 months

## 2024-01-25 NOTE — PROGRESS NOTES
Review of Systems   Constitutional:  Negative for appetite change, fatigue and fever.   HENT: Negative.  Negative for hearing loss, tinnitus, trouble swallowing and voice change.    Eyes: Negative.  Negative for photophobia, pain and visual disturbance.   Respiratory: Negative.  Negative for shortness of breath.    Cardiovascular: Negative.  Negative for palpitations.   Gastrointestinal: Negative.  Negative for nausea and vomiting.   Endocrine: Negative.  Negative for cold intolerance.   Genitourinary: Negative.  Negative for dysuria, frequency and urgency.   Musculoskeletal:  Negative for back pain, gait problem, myalgias, neck pain and neck stiffness.   Skin: Negative.  Negative for rash.   Allergic/Immunologic: Negative.    Neurological:  Positive for headaches. Negative for dizziness, tremors, seizures, syncope, facial asymmetry, speech difficulty, weakness, light-headedness and numbness.   Hematological: Negative.  Does not bruise/bleed easily.   Psychiatric/Behavioral:  Positive for confusion (Trouble focusing/concentrating). Negative for hallucinations and sleep disturbance.    All other systems reviewed and are negative.
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
## 2024-01-30 LAB
25(OH)D3+25(OH)D2 SERPL-MCNC: 12.7 NG/ML (ref 30–100)
FOLATE SERPL-MCNC: 6.8 NG/ML
TSH SERPL DL<=0.005 MIU/L-ACNC: 1.79 UIU/ML (ref 0.45–4.5)
VIT B12 SERPL-MCNC: 418 PG/ML (ref 232–1245)

## 2024-02-05 ENCOUNTER — OFFICE VISIT (OUTPATIENT)
Dept: OBGYN CLINIC | Facility: CLINIC | Age: 19
End: 2024-02-05
Payer: COMMERCIAL

## 2024-02-05 VITALS
SYSTOLIC BLOOD PRESSURE: 112 MMHG | DIASTOLIC BLOOD PRESSURE: 60 MMHG | BODY MASS INDEX: 21.7 KG/M2 | WEIGHT: 155 LBS | HEIGHT: 71 IN

## 2024-02-05 DIAGNOSIS — G44.319 ACUTE POST-TRAUMATIC HEADACHE, NOT INTRACTABLE: ICD-10-CM

## 2024-02-05 DIAGNOSIS — S06.0X9A CONCUSSION WITH LOSS OF CONSCIOUSNESS, INITIAL ENCOUNTER: Primary | ICD-10-CM

## 2024-02-05 DIAGNOSIS — H53.9 CHANGES IN VISION: ICD-10-CM

## 2024-02-05 PROCEDURE — 99214 OFFICE O/P EST MOD 30 MIN: CPT | Performed by: FAMILY MEDICINE

## 2024-02-05 RX ORDER — MULTIVITAMIN
1 TABLET ORAL DAILY
COMMUNITY

## 2024-02-05 NOTE — PATIENT INSTRUCTIONS
I explained to patient and mother that Phil still appears to have concussion symptoms.  Of note on his examination he continues to have dysfunction of his right eye and minimal balance changes.  He was also noted to have adverse reactions associated with cyproheptadine.    I explained that his vision changes should improve as his brain continues to heal after concussion.  I have also ordered an MRI of the brain for further evaluation and to ensure there is no other pathology occurring.  If Phil's vision does not improve we will consider neuro-ophthalmology visit.     I agree with ceasing cyproheptadine medication and continuing steroid pack.  For now until he finishes steroid pack he may take Tylenol as needed for headache.    He is also to continue his physical therapy for concussion.    I also had detailed discussion regarding work with Phil and recommended FMLA forms to protect his job.  I also provided him with a work note and recommend against work at this time due to severe symptoms with reading.     Educated risks of mixing NSAIDS ( (non-steroidal anti-inflammatory pills including advil, ibuprofen, motrin, meloxicam, celecoxib, aleve, naproxen, and aspirin containing products) with each other or with steroids (such as prednisone, medrol). Explained risks of mixing these medications including stomach ulcer, severe internal bleeding, and kidney failure. Instructed not to take NSAIDS if have history of stomach ulcers, kidney issues, or uncontrolled hypertension. Instructed patient to use only one brand as prescribed. For naproxen, a maximum of 500 mg per dose every 12 hours and no more than two doses or 1,000mg per day. For Ibuprofen, a maximum of 800 mg per dose every 6 hours but no more than 3 doses or 2,400 mg per day. Never take these medications together. Never take these medications the same day. For severe pain and only if you have no liver problems, you may add Tylenol (also known as  acetaminophen) maximum of 1,000  Mg per dose every 6 hours but no more 3 doses or 3,000 mg per day. Patient expressed understanding and agreed to plan.

## 2024-02-05 NOTE — LETTER
February 5, 2024     Patient: Phil Valentine  YOB: 2005  Date of Visit: 2/5/2024      To Whom it May Concern:    Phil Valentine is under my professional care. Phil was seen in my office on 2/5/2024.     I recommend against work at this time.     Patient to be reevaluated in 2 weeks on or about 2/19/24.      If you have any questions or concerns, please don't hesitate to call.         Sincerely,          Walter Bhardwaj III, DO        CC: No Recipients

## 2024-02-05 NOTE — PROGRESS NOTES
1. Concussion with loss of consciousness, initial encounter        2. Acute post-traumatic headache, not intractable  MRI brain wo contrast      3. Changes in vision          Orders Placed This Encounter   Procedures    MRI brain wo contrast        IMAGING STUDIES: (I personally reviewed images in PACS and report):         PAST REPORTS:        ASSESSMENT/PLAN:  Complex Head injury with Mild Traumatic Brain Injury consistent with Concussion  Worsening Symptoms after starting cyproheptadine given by Neurologist  Continues with Right Eye Vision Changes  Written letter provided for work restrictions due to functional deficit  Neck whiplash injury  DOI: 1/7/24 Snowboarding  FUI: 4 weeks 1 day  Occupation: [employer]    Recent Consultations:  Ophthalmology  Neurology- [facility]      Repeat X-ray next visit: None    Return in about 2 weeks (around 2/19/2024).    Patient instructions below verbally summarized in person during encounter:  Patient Instructions   I explained to patient and mother that Phil still appears to have concussion symptoms.  Of note on his examination he continues to have dysfunction of his right eye and minimal balance changes.  He was also noted to have adverse reactions associated with cyproheptadine.    I explained that his vision changes should improve as his brain continues to heal after concussion.  I have also ordered an MRI of the brain for further evaluation and to ensure there is no other pathology occurring.  If Phil's vision does not improve we will consider neuro-ophthalmology visit.     I agree with ceasing cyproheptadine medication and continuing steroid pack.  For now until he finishes steroid pack he may take Tylenol as needed for headache.    He is also to continue his physical therapy for concussion.    I also had detailed discussion regarding work with Phil and recommended FMLA forms to protect his job.  I also provided him with a work note and recommend against work at this  time due to severe symptoms with reading.     Educated risks of mixing NSAIDS ( (non-steroidal anti-inflammatory pills including advil, ibuprofen, motrin, meloxicam, celecoxib, aleve, naproxen, and aspirin containing products) with each other or with steroids (such as prednisone, medrol). Explained risks of mixing these medications including stomach ulcer, severe internal bleeding, and kidney failure. Instructed not to take NSAIDS if have history of stomach ulcers, kidney issues, or uncontrolled hypertension. Instructed patient to use only one brand as prescribed. For naproxen, a maximum of 500 mg per dose every 12 hours and no more than two doses or 1,000mg per day. For Ibuprofen, a maximum of 800 mg per dose every 6 hours but no more than 3 doses or 2,400 mg per day. Never take these medications together. Never take these medications the same day. For severe pain and only if you have no liver problems, you may add Tylenol (also known as acetaminophen) maximum of 1,000  Mg per dose every 6 hours but no more 3 doses or 3,000 mg per day. Patient expressed understanding and agreed to plan.          __________________________________________________________________________    HISTORY OF PRESENT ILLNESS:    Patient ID:  Phil Valentine is a 18 y.o. male     School:    Related to:  Snowboarding     School Status: Not back to school     Injury Description:  Date / Time: 1/7/2024  :  Patient  Injury Description:   Fell back to 3 feet striking the back of his helmet onto metal rail while tending to perform snowboarding trick.  Patient seen emergency department where he complained of blurred vision photophobia and headache.  Associated symptoms do include neck pain with numbness into the right hand.     Laboratory investigation revealed slight hypokalemia 3.4 as well as slight elevation leukocytosis WBC 12.28.     He was placed in observation after injury event and discharged home.      Amnesia:               Retrograde:  no              Anterograde:  no              LOC: YES     Early Signs:  Blurred vision and Headache  Seizures:  No  CT Scan:  Yes   History of Concussion:  Yes.   How many?  1 in 2022                     Headache History:  no chronic headaches     Currently constant moderate worse with light and noise     Right eye depth perception and focusing is blurry.      Family History of Headache:  No  Developmental History:   no  History of Sleep Disorder:  No  Psychiatric History:   no    Do symptoms worsen with Cognitive Activity? YES  Unable to tolerate computer screens     Patient currently feeling 40% overall.     Since last visit patient did see neurology who placed him on cyproheptadine for headaches and resulted in development of nausea as well as other symptoms including worsening headache.  He did stop the Cipro after being today and was placed on prednisone.  Mother present with patient today and states that when Phil was a child he had an aggressive reaction to prednisone when he was 2 or 3 years old and describes biting and hitting episodes at that time. He did have poison ivy later in life and tolerated prednisone without issue.  Took 1 dose today and currently does not feel any aggression.    Currently headache is 5/10. Constant mild. Worse with light reading and noise.      Denies any floaters.         Review of Systems   Constitutional:  Positive for fatigue. Negative for chills and fever.   HENT:  Negative for ear pain and hearing loss.    Eyes:  Positive for photophobia.   Gastrointestinal:  Positive for nausea. Negative for vomiting.   Musculoskeletal:  Negative for neck pain.   Neurological:  Positive for dizziness and headaches.   Psychiatric/Behavioral:  Positive for decreased concentration and sleep disturbance. Negative for behavioral problems, confusion and suicidal ideas. The patient is not nervous/anxious.          Following history reviewed and update:    Past Medical History:  "  Diagnosis Date    GERD (gastroesophageal reflux disease)     Mesenteric adenitis     Noted on CT December 2018, improved with conservative management     No past surgical history on file.  Social History   Social History     Substance and Sexual Activity   Alcohol Use Not Currently     Social History     Substance and Sexual Activity   Drug Use Never     Social History     Tobacco Use   Smoking Status Never   Smokeless Tobacco Never     Family History   Problem Relation Age of Onset    Colon polyps Neg Hx     Colon cancer Neg Hx      Allergies   Allergen Reactions    Amoxicillin Hives          Physical Exam  /60 (BP Location: Left arm, Patient Position: Sitting, Cuff Size: Standard)   Ht 5' 11\" (1.803 m)   Wt 70.3 kg (155 lb)   BMI 21.62 kg/m²         Ortho Exam    Cervical  Midline spinous process tenderness: None  Muscular Tenderness: +    Raccoon Eyes: none  Ear Drainage: none  Nose Drainage: none  PERRL  EOMI:  Normal without pain  Smooth Pursuits: normal  Two finger Point Saccades (two finger points eye movement): normal  One finger Focus with Head Rotation:  normal  Near-Point Convergence (<10cm): normal.  No doubling.  No convergence insufficiency.  Unilateral Monocular Accomodation (<12cm): normal left; abnormal right  Finger to nose: Normal  No Dysdiadokinesia  Single Leg Stance Eyes Open: normal  Single Leg Stance Eyes Closed: normal  Tandem Gait Eyes Open: normal  Tandem Gait Eyes Closed: abnormal      __________________________________________________________________________  Procedures                  "

## 2024-02-23 NOTE — TELEPHONE ENCOUNTER
MARIANAM for pt. Did he have MRI? I called [facility] and he did not have it there where he is capitated. Left my phone # and the phone room # so they can transfer to [facility] if needed.

## 2024-02-26 ENCOUNTER — OFFICE VISIT (OUTPATIENT)
Dept: OBGYN CLINIC | Facility: CLINIC | Age: 19
End: 2024-02-26
Payer: COMMERCIAL

## 2024-02-26 VITALS
HEIGHT: 71 IN | BODY MASS INDEX: 22.4 KG/M2 | SYSTOLIC BLOOD PRESSURE: 118 MMHG | DIASTOLIC BLOOD PRESSURE: 60 MMHG | WEIGHT: 160 LBS

## 2024-02-26 DIAGNOSIS — G44.319 ACUTE POST-TRAUMATIC HEADACHE, NOT INTRACTABLE: ICD-10-CM

## 2024-02-26 DIAGNOSIS — S06.0X9A CONCUSSION WITH LOSS OF CONSCIOUSNESS, INITIAL ENCOUNTER: Primary | ICD-10-CM

## 2024-02-26 PROCEDURE — 99213 OFFICE O/P EST LOW 20 MIN: CPT | Performed by: FAMILY MEDICINE

## 2024-02-26 NOTE — PROGRESS NOTES
1. Concussion with loss of consciousness, initial encounter        2. Acute post-traumatic headache, not intractable          No orders of the defined types were placed in this encounter.       IMAGING STUDIES: (I personally reviewed images in PACS and report):         PAST REPORTS:        ASSESSMENT/PLAN:  Complex Head injury with Mild Traumatic Brain Injury consistent with Concussion  Worsening Symptoms after starting cyproheptadine given by Neurologist  Continues with Right Eye Vision Changes  Written letter provided for work restrictions due to functional deficit  Neck whiplash injury  DOI: 1/7/24 Snowboarding  FUI: 7 weeks 1 days  Occupation: [employer]     Recent Consultations:  Ophthalmology  Neurology- [facility]    Repeat X-ray next visit: None    Return in about 3 weeks (around 3/18/2024).    Patient instructions below verbally summarized in person during encounter:  Patient Instructions   Have MRI performed  May begin light aerobic activity 1 week after injury event  Take a Multivitamin daily if not already  Sleep hygiene- at least 8 hours of sleep  No excessive use of digital screens but may use phone and play video games with breaks to rest the eyes at least once per hour. Stop all digital screen use if symptoms begin to worsen.  Do not take NSAIDs (ibuprofen, motrin, aspirin, advil, aleve, naproxen) until 72 hours after injury event, but may take tylenol (acetaminophen) as needed for headaches    red flags symptoms occurring at any time even after 24 hours include: severe or worsening headaches, somnolence/sleepiness/lethargy, confusion, restlessness/unsteadiness, seizures, vision changes, vomiting, fever, stiff neck, loss of bowel or bladder control, and weakness or numbness of any part of body. If red flag symptoms occur then immediately go to ER. If patient suffers another blow to head or body during sports or non-sports play then there is a risk of severe and possibly permanent brain injury from second hit  syndrome brain edema. During concussion recovery, patient is to not participate in pick-up games, sports, weight-training, exercise, or gym until cleared by physician. If any return of symptoms requiring more academic rest then sports play must also be suspended due to delayed healing of concussion.         __________________________________________________________________________    HISTORY OF PRESENT ILLNESS:    Patient ID:  Phil Valentine is a 18 y.o. male     School:    Related to:  Snowboarding     School Status: Not back to school     Injury Description:  Date / Time: 1/7/2024  :  Patient  Injury Description:   Fell back to 3 feet striking the back of his helmet onto metal rail while tending to perform snowboarding trick.  Patient seen emergency department where he complained of blurred vision photophobia and headache.  Associated symptoms do include neck pain with numbness into the right hand.     Laboratory investigation revealed slight hypokalemia 3.4 as well as slight elevation leukocytosis WBC 12.28.     He was placed in observation after injury event and discharged home.      Amnesia:              Retrograde:  no              Anterograde:  no              LOC: YES     Early Signs:  Blurred vision and Headache  Seizures:  No  CT Scan:  Yes   History of Concussion:  Yes.   How many?  1 in 2022 improved after 1 month                    Headache History:  no chronic headaches     Currently constant moderate worse with light and noise     Right eye depth perception and focusing is blurry.      Family History of Headache:  No  Developmental History:   no  History of Sleep Disorder:  No  Psychiatric History:   no     Do symptoms worsen with Cognitive Activity? YES  Unable to tolerate computer screens     Patient currently feeling 75% overall.     F/U concussion:  Headaches now intermittent with few hours of relief at a time throughout the day. Mild to moderate pain. Triggers light. Has been able to  "perform some work at home.     Did feel relief after taking the steroid.     Vision returned to normal except for some blurriness in right eye.     Improving with physical therapy.       Review of Systems      Following history reviewed and update:    Past Medical History:   Diagnosis Date    GERD (gastroesophageal reflux disease)     Mesenteric adenitis     Noted on CT December 2018, improved with conservative management     No past surgical history on file.  Social History   Social History     Substance and Sexual Activity   Alcohol Use Not Currently     Social History     Substance and Sexual Activity   Drug Use Never     Social History     Tobacco Use   Smoking Status Never   Smokeless Tobacco Never     Family History   Problem Relation Age of Onset    Colon polyps Neg Hx     Colon cancer Neg Hx      Allergies   Allergen Reactions    Amoxicillin Hives          Physical Exam  /60 (BP Location: Left arm, Patient Position: Sitting, Cuff Size: Standard)   Ht 5' 11\" (1.803 m)   Wt 72.6 kg (160 lb)   BMI 22.32 kg/m²         Ortho Exam  Williamson Sign: none  Raccoon Eyes: none  Ear Drainage: none  Nose Drainage: none  PERRL  EOMI:  Normal without pain  Smooth Pursuits: normal  Two finger Point Saccades (two finger points eye movement): normal  One finger Focus with Head Rotation:  normal  Near-Point Convergence (<10cm): normal.  No doubling.  No convergence insufficiency.  Unilateral Monocular Accomodation (<12cm): normal  Finger to nose: Normal  No Dysdiadokinesia  Single Leg Stance Eyes Open: normal  Single Leg Stance Eyes Closed: normal  Tandem Gait Eyes Open: normal  Tandem Gait Eyes Closed: normal    Cervical  ROM: intact  Midline spinous process tenderness: None  Muscular Tenderness: None  Sensation UE Bilateral:  C5: normal  C6: normal  C7: normal  C8: normal  T1: normal  Strength UE: 5/5 elbow, wrist, fingers bilateral  Reflexes:   Spurlings:      "     __________________________________________________________________________  Procedures

## 2024-02-26 NOTE — PATIENT INSTRUCTIONS
Have MRI performed  May begin light aerobic activity 1 week after injury event  Take a Multivitamin daily if not already  Sleep hygiene- at least 8 hours of sleep  No excessive use of digital screens but may use phone and play video games with breaks to rest the eyes at least once per hour. Stop all digital screen use if symptoms begin to worsen.  Do not take NSAIDs (ibuprofen, motrin, aspirin, advil, aleve, naproxen) until 72 hours after injury event, but may take tylenol (acetaminophen) as needed for headaches    red flags symptoms occurring at any time even after 24 hours include: severe or worsening headaches, somnolence/sleepiness/lethargy, confusion, restlessness/unsteadiness, seizures, vision changes, vomiting, fever, stiff neck, loss of bowel or bladder control, and weakness or numbness of any part of body. If red flag symptoms occur then immediately go to ER. If patient suffers another blow to head or body during sports or non-sports play then there is a risk of severe and possibly permanent brain injury from second hit syndrome brain edema. During concussion recovery, patient is to not participate in pick-up games, sports, weight-training, exercise, or gym until cleared by physician. If any return of symptoms requiring more academic rest then sports play must also be suspended due to delayed healing of concussion.

## 2024-02-26 NOTE — LETTER
February 26, 2024     Patient: Phil Valentine  YOB: 2005  Date of Visit: 2/26/2024      To Whom it May Concern:    Phil Valentine is under my professional care. Phil was seen in my office on 2/26/2024. Phil may return to work on 2/26/24 with recommendation to work from home. I recommend against driving. Patient to be reevaluated on or about 3/18/24 .    If you have any questions or concerns, please don't hesitate to call.         Sincerely,          Walter Bhardwaj III, DO        CC: No Recipients

## 2024-02-27 ENCOUNTER — TELEPHONE (OUTPATIENT)
Age: 19
End: 2024-02-27

## 2024-02-27 NOTE — TELEPHONE ENCOUNTER
Patient's mri does not require stat. We have a CT scan of the head already which should be sufficient in the interim.

## 2024-02-27 NOTE — TELEPHONE ENCOUNTER
Caller: Purnima mother     Doctor: Lashae    Reason for call: She states that [facility] cannot fit the patient in for MRI until 3/25. They said they can do it STAT if the doctor calls into the facility and lets them know he needs a STAT MRI. The number to call: [phone] option 4. Please advise     Call back#: [phone]-mother

## 2024-02-27 NOTE — TELEPHONE ENCOUNTER
Called mom back @ # provided  No answer  Lvm relating Dr. Martínez's message  Asked mom to call back if she had any questions or concerns

## 2024-02-27 NOTE — TELEPHONE ENCOUNTER
Caller: Mom     Doctor: Lashae     Reason for call: Mom states they will need auth from us to get MRI done at [facility]   Patient is not scheduled      Call back#:     ValentinePurnima (Mother)  [phone] (

## 2024-02-27 NOTE — TELEPHONE ENCOUNTER
MRI does NOT need to be STAT    Spoke to mom  Mom will schedule MRI once AUTHORIZATION has been obtained    Please contact mom when this is done so that she may able to SCHEDULE MRI    Thanks

## 2024-02-27 NOTE — TELEPHONE ENCOUNTER
Caller: Patient's mom    Doctor: Lashae    Reason for call:     She states that [facility] cannot fit the patient in for MRI until 3/25. They said they can do it STAT if the doctor calls into the facility and lets them know he needs a STAT MRI. The number to call: [phone] option 4. NPI # [id number], she is asking if the authorization is approved, this is what is holding the MRI up...    Please advise.     Call back#: [phone]

## 2024-02-29 NOTE — TELEPHONE ENCOUNTER
Pt's mom is calling again for a status on authorization. States she would like a call from prior auth dept to see if theres anything she should do to get this processed. Advised her that the office would call once prior auth had been given.

## 2024-03-18 ENCOUNTER — OFFICE VISIT (OUTPATIENT)
Dept: OBGYN CLINIC | Facility: CLINIC | Age: 19
End: 2024-03-18
Payer: COMMERCIAL

## 2024-03-18 VITALS
BODY MASS INDEX: 22.4 KG/M2 | SYSTOLIC BLOOD PRESSURE: 128 MMHG | DIASTOLIC BLOOD PRESSURE: 60 MMHG | WEIGHT: 160 LBS | HEIGHT: 71 IN

## 2024-03-18 DIAGNOSIS — D72.829 LEUKOCYTOSIS, UNSPECIFIED TYPE: ICD-10-CM

## 2024-03-18 DIAGNOSIS — E87.6 HYPOKALEMIA: ICD-10-CM

## 2024-03-18 DIAGNOSIS — S06.0X9A CONCUSSION WITH LOSS OF CONSCIOUSNESS, INITIAL ENCOUNTER: Primary | ICD-10-CM

## 2024-03-18 PROCEDURE — 99213 OFFICE O/P EST LOW 20 MIN: CPT | Performed by: FAMILY MEDICINE

## 2024-03-18 NOTE — PROGRESS NOTES
1. Concussion with loss of consciousness, initial encounter        2. Hypokalemia  Basic metabolic panel    Basic metabolic panel      3. Leukocytosis, unspecified type  CBC and differential        Orders Placed This Encounter   Procedures    CBC and differential    Basic metabolic panel        IMAGING STUDIES: (I personally reviewed images in PACS and report):         PAST REPORTS:        ASSESSMENT/PLAN:  Complex Head injury with Mild Traumatic Brain Injury consistent with Concussion  Worsening Symptoms after starting cyproheptadine given by Neurologist  Continues with Right Eye Vision Changes  Written letter provided for work restrictions due to functional deficit  Neck whiplash injury  DOI: 1/7/24 Snowboarding  FUI: 10 weeks 1 day  Occupation: [employer]     Recent Consultations:  Ophthalmology  Neurology- [facility]    Repeat X-ray next visit: None    Return if symptoms worsen or fail to improve.    Patient instructions below verbally summarized in person during encounter:  Patient Instructions   Recommend return to normal activities including work and driving.     For exercise, start return to play (RTP) protocol per International Consensus on Concussion Guidelines of graduated participation after at least one day of symptom-free full academic load. may return to full sport, gym, weight-training, and exercise after completion of RTP protocol.      __________________________________________________________________________    HISTORY OF PRESENT ILLNESS:    Patient ID:  Phil Valentine is a 18 y.o. male     School:    Related to:  Snowboarding     School Status: Not back to school     Injury Description:  Date / Time: 1/7/2024  :  Patient  Injury Description:   Fell back to 3 feet striking the back of his helmet onto metal rail while tending to perform snowboarding trick.  Patient seen emergency department where he complained of blurred vision photophobia and headache.  Associated symptoms do include neck pain  with numbness into the right hand.     Laboratory investigation revealed slight hypokalemia 3.4 as well as slight elevation leukocytosis WBC 12.28.     He was placed in observation after injury event and discharged home.      Amnesia:              Retrograde:  no              Anterograde:  no              LOC: YES     Early Signs:  Blurred vision and Headache  Seizures:  No  CT Scan:  Yes   History of Concussion:  Yes.   How many?  1 in 2022 improved after 1 month                    Headache History:  no chronic headaches     Currently constant moderate worse with light and noise     Right eye depth perception and focusing is blurry.      Family History of Headache:  No  Developmental History:   no  History of Sleep Disorder:  No  Psychiatric History:   no       Today feels 100% improved. Last headache 1 week ago on computer.     Tolerating gym no symptoms.     Has been working from home for job without issue.     Vision changes resolved    Review of Systems   Constitutional:  Negative for chills, fatigue and fever.   HENT:  Negative for ear pain and hearing loss.    Eyes:  Negative for photophobia.   Gastrointestinal:  Negative for nausea and vomiting.   Musculoskeletal:  Negative for neck pain.   Neurological:  Negative for dizziness and headaches.   Psychiatric/Behavioral:  Positive for sleep disturbance (sleep schedule off now since previous difficulty sleeping). Negative for behavioral problems, confusion, decreased concentration and suicidal ideas. The patient is not nervous/anxious.          Following history reviewed and update:    Past Medical History:   Diagnosis Date    GERD (gastroesophageal reflux disease)     Mesenteric adenitis     Noted on CT December 2018, improved with conservative management     No past surgical history on file.  Social History   Social History     Substance and Sexual Activity   Alcohol Use Not Currently     Social History     Substance and Sexual Activity   Drug Use Never  "    Social History     Tobacco Use   Smoking Status Never   Smokeless Tobacco Never     Family History   Problem Relation Age of Onset    Colon polyps Neg Hx     Colon cancer Neg Hx      Allergies   Allergen Reactions    Amoxicillin Hives          Physical Exam  /60 (BP Location: Left arm, Patient Position: Sitting, Cuff Size: Standard)   Ht 5' 11\" (1.803 m)   Wt 72.6 kg (160 lb)   BMI 22.32 kg/m²         Ortho Exam    Williamson Sign: none  Raccoon Eyes: none  Ear Drainage: none  Nose Drainage: none  PERRL  EOMI:  Normal without pain  Smooth Pursuits: normal  Two finger Point Saccades (two finger points eye movement): normal  One finger Focus with Head Rotation:  normal  Near-Point Convergence (<10cm): normal.  No doubling.  No convergence insufficiency.  Unilateral Monocular Accomodation (<12cm): normal  Finger to nose: Normal  No Dysdiadokinesia  Single Leg Stance Eyes Open: normal  Single Leg Stance Eyes Closed: normal  Tandem Gait Eyes Open: normal  Tandem Gait Eyes Closed: normal    __________________________________________________________________________  Procedures                  "

## 2024-03-18 NOTE — PATIENT INSTRUCTIONS
Recommend return to normal activities including work and driving.     For exercise, start return to play (RTP) protocol per International Consensus on Concussion Guidelines of graduated participation after at least one day of symptom-free full academic load. may return to full sport, gym, weight-training, and exercise after completion of RTP protocol.

## 2024-03-18 NOTE — LETTER
March 18, 2024     Patient: Phil Valentine  YOB: 2005  Date of Visit: 3/18/2024      To Whom it May Concern:    Phil Valentine is under my professional care. Phil was seen in my office on 3/18/2024. Phil may return to work on 3/18/24 full duty no restrictions .    If you have any questions or concerns, please don't hesitate to call.         Sincerely,          Walter Bhardwaj III, DO        CC: No Recipients